# Patient Record
Sex: FEMALE | Race: BLACK OR AFRICAN AMERICAN | NOT HISPANIC OR LATINO | URBAN - METROPOLITAN AREA
[De-identification: names, ages, dates, MRNs, and addresses within clinical notes are randomized per-mention and may not be internally consistent; named-entity substitution may affect disease eponyms.]

---

## 2018-12-21 ENCOUNTER — OFFICE VISIT (OUTPATIENT)
Dept: URBAN - METROPOLITAN AREA CLINIC 17 | Facility: CLINIC | Age: 83
End: 2018-12-21
Payer: COMMERCIAL

## 2018-12-21 DIAGNOSIS — H52.4 PRESBYOPIA: Primary | ICD-10-CM

## 2018-12-21 PROCEDURE — 92015 DETERMINE REFRACTIVE STATE: CPT | Performed by: OPTOMETRIST

## 2018-12-21 PROCEDURE — 92012 INTRM OPH EXAM EST PATIENT: CPT | Performed by: OPTOMETRIST

## 2018-12-21 ASSESSMENT — VISUAL ACUITY
OS: 20/40
OD: 20/40

## 2018-12-21 ASSESSMENT — INTRAOCULAR PRESSURE
OD: 15
OS: 11

## 2019-01-23 ENCOUNTER — OFFICE VISIT (OUTPATIENT)
Dept: URBAN - METROPOLITAN AREA CLINIC 17 | Facility: CLINIC | Age: 84
End: 2019-01-23
Payer: COMMERCIAL

## 2019-01-23 DIAGNOSIS — Z96.1 PRESENCE OF INTRAOCULAR LENS: ICD-10-CM

## 2019-01-23 DIAGNOSIS — E11.9 TYPE 2 DIABETES MELLITUS W/O COMPLICATION: Primary | ICD-10-CM

## 2019-01-23 PROCEDURE — 92014 COMPRE OPH EXAM EST PT 1/>: CPT | Performed by: OPTOMETRIST

## 2019-01-23 ASSESSMENT — INTRAOCULAR PRESSURE
OD: 12
OS: 10

## 2019-01-23 NOTE — IMPRESSION/PLAN
Impression: Type 2 diabetes mellitus w/o complication: T75.4. Plan: Diabetes type II: no background retinopathy, no signs of neovascularization noted. Discussed ocular and systemic benefits of blood sugar control.

## 2021-01-13 ENCOUNTER — OFFICE VISIT (OUTPATIENT)
Dept: URBAN - METROPOLITAN AREA CLINIC 17 | Facility: CLINIC | Age: 86
End: 2021-01-13
Payer: COMMERCIAL

## 2021-01-13 DIAGNOSIS — H43.813 VITREOUS DEGENERATION, BILATERAL: ICD-10-CM

## 2021-01-13 DIAGNOSIS — H04.123 DRY EYE SYNDROME OF BILATERAL LACRIMAL GLANDS: ICD-10-CM

## 2021-01-13 DIAGNOSIS — H26.492 OTHER SECONDARY CATARACT OF LEFT EYE: ICD-10-CM

## 2021-01-13 PROCEDURE — 92014 COMPRE OPH EXAM EST PT 1/>: CPT | Performed by: OPTOMETRIST

## 2021-01-13 ASSESSMENT — INTRAOCULAR PRESSURE
OS: 11
OD: 10

## 2021-01-13 NOTE — IMPRESSION/PLAN
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 Plan: Discussed diagnosis in detail with patient. No treatment is required at this time. Will continue to observe condition and or symptoms. Patient instructed to call if condition gets worse.  Order Refraction per patient request.

## 2021-01-13 NOTE — IMPRESSION/PLAN
Impression: Type 2 diabetes mellitus w/o complication: G95.5. Plan: No diabetic retinopathy today. Discussed importance of closely monitoring and controlling glucose to minimize risks of progression of disease. Patient instructed to notify clinic immediately if changes to vision are noted.